# Patient Record
Sex: FEMALE | Race: WHITE | URBAN - METROPOLITAN AREA
[De-identification: names, ages, dates, MRNs, and addresses within clinical notes are randomized per-mention and may not be internally consistent; named-entity substitution may affect disease eponyms.]

---

## 2022-07-25 ENCOUNTER — APPOINTMENT (RX ONLY)
Dept: URBAN - METROPOLITAN AREA CLINIC 41 | Facility: CLINIC | Age: 27
Setting detail: DERMATOLOGY
End: 2022-07-25

## 2022-07-25 DIAGNOSIS — D22 MELANOCYTIC NEVI: ICD-10-CM | Status: STABLE

## 2022-07-25 DIAGNOSIS — L71.8 OTHER ROSACEA: ICD-10-CM | Status: INADEQUATELY CONTROLLED

## 2022-07-25 DIAGNOSIS — L72.8 OTHER FOLLICULAR CYSTS OF THE SKIN AND SUBCUTANEOUS TISSUE: ICD-10-CM | Status: INADEQUATELY CONTROLLED

## 2022-07-25 PROBLEM — D22.62 MELANOCYTIC NEVI OF LEFT UPPER LIMB, INCLUDING SHOULDER: Status: ACTIVE | Noted: 2022-07-25

## 2022-07-25 PROCEDURE — ? PRESCRIPTION

## 2022-07-25 PROCEDURE — ? ADDITIONAL NOTES

## 2022-07-25 PROCEDURE — ? PRESCRIPTION MEDICATION MANAGEMENT

## 2022-07-25 PROCEDURE — ? COUNSELING

## 2022-07-25 PROCEDURE — 99203 OFFICE O/P NEW LOW 30 MIN: CPT | Mod: 25

## 2022-07-25 PROCEDURE — ? INCISION AND DRAINAGE

## 2022-07-25 PROCEDURE — 10060 I&D ABSCESS SIMPLE/SINGLE: CPT

## 2022-07-25 RX ORDER — MOMETASONE FUROATE 1 MG/G
OINTMENT TOPICAL
Qty: 1 | Refills: 0 | Status: ERX | COMMUNITY
Start: 2022-07-25

## 2022-07-25 RX ORDER — OXYMETAZOLINE HYDROCHLORIDE 1 G/100G
CREAM TOPICAL
Qty: 30 | Refills: 3 | Status: ERX | COMMUNITY
Start: 2022-07-25

## 2022-07-25 RX ORDER — MUPIROCIN 20 MG/G
OINTMENT TOPICAL
Qty: 22 | Refills: 0 | Status: ERX | COMMUNITY
Start: 2022-07-25

## 2022-07-25 RX ORDER — IVERMECTIN 10 MG/G
CREAM TOPICAL
Qty: 45 | Refills: 3 | Status: ERX | COMMUNITY
Start: 2022-07-25

## 2022-07-25 RX ADMIN — OXYMETAZOLINE HYDROCHLORIDE: 1 CREAM TOPICAL at 00:00

## 2022-07-25 RX ADMIN — MOMETASONE FUROATE: 1 OINTMENT TOPICAL at 00:00

## 2022-07-25 RX ADMIN — IVERMECTIN: 10 CREAM TOPICAL at 00:00

## 2022-07-25 RX ADMIN — MUPIROCIN: 20 OINTMENT TOPICAL at 00:00

## 2022-07-25 ASSESSMENT — LOCATION ZONE DERM
LOCATION ZONE: ARM
LOCATION ZONE: FACE
LOCATION ZONE: EAR
LOCATION ZONE: EAR

## 2022-07-25 ASSESSMENT — LOCATION SIMPLE DESCRIPTION DERM
LOCATION SIMPLE: LEFT SHOULDER
LOCATION SIMPLE: RIGHT EAR
LOCATION SIMPLE: RIGHT EAR
LOCATION SIMPLE: LEFT CHEEK
LOCATION SIMPLE: RIGHT CHEEK

## 2022-07-25 ASSESSMENT — LOCATION DETAILED DESCRIPTION DERM
LOCATION DETAILED: RIGHT MEDIAL MALAR CHEEK
LOCATION DETAILED: LEFT ANTERIOR SHOULDER
LOCATION DETAILED: RIGHT TRAGUS
LOCATION DETAILED: LEFT CENTRAL MALAR CHEEK
LOCATION DETAILED: RIGHT INFERIOR CRUS OF ANTIHELIX

## 2022-07-25 NOTE — PROCEDURE: PRESCRIPTION MEDICATION MANAGEMENT
Plan: NG explained how to use it, the timeframe it works, and to avoid lips. NG mentioned other options like lasers
Samples Given: Rhofade
Render In Strict Bullet Format?: No
Initiate Treatment: Rhofade ointment in morning \\nSoolantra ointment at night
Detail Level: Zone
Plan: Drained and used aluminum chloride
Initiate Treatment: Mometasone  bid 5-7 days\\nMupirocin ointment

## 2022-07-25 NOTE — PROCEDURE: ADDITIONAL NOTES
Detail Level: Simple
Render Risk Assessment In Note?: no
Additional Notes: LEE notes it looks okay
Additional Notes: Patient consent was obtained to proceed with the visit and recommended plan of care after discussion of all risks and benefits, including the risks of COVID-19 exposure.

## 2022-07-25 NOTE — HPI: BODY LOCATION - ARM
Additional History: Pt wants to check spot on her arm that she has had forever but it changed shape a week ago

## 2023-06-09 NOTE — HPI: RASH (ROSACEA)
Is This A New Presentation, Or A Follow-Up?: Rosacea
Additional History: Pt wants to check her rosacea bc mom has it and look into treatments
show
1.84